# Patient Record
Sex: FEMALE | Race: WHITE
[De-identification: names, ages, dates, MRNs, and addresses within clinical notes are randomized per-mention and may not be internally consistent; named-entity substitution may affect disease eponyms.]

---

## 2018-09-23 ENCOUNTER — HOSPITAL ENCOUNTER (EMERGENCY)
Dept: HOSPITAL 35 - ER | Age: 63
Discharge: HOME | End: 2018-09-23
Payer: COMMERCIAL

## 2018-09-23 VITALS — HEIGHT: 62 IN | WEIGHT: 170 LBS | BODY MASS INDEX: 31.28 KG/M2

## 2018-09-23 VITALS — SYSTOLIC BLOOD PRESSURE: 136 MMHG | DIASTOLIC BLOOD PRESSURE: 79 MMHG

## 2018-09-23 DIAGNOSIS — I10: ICD-10-CM

## 2018-09-23 DIAGNOSIS — M54.32: Primary | ICD-10-CM

## 2018-09-23 DIAGNOSIS — F17.210: ICD-10-CM

## 2018-09-23 DIAGNOSIS — E03.9: ICD-10-CM

## 2018-09-23 DIAGNOSIS — F41.9: ICD-10-CM

## 2018-11-30 ENCOUNTER — HOSPITAL ENCOUNTER (INPATIENT)
Dept: HOSPITAL 96 - M.ERS | Age: 63
LOS: 4 days | Discharge: HOME | DRG: 394 | End: 2018-12-04
Attending: INTERNAL MEDICINE | Admitting: INTERNAL MEDICINE
Payer: MEDICAID

## 2018-11-30 VITALS
SYSTOLIC BLOOD PRESSURE: 126 MMHG | BODY MASS INDEX: 30.89 KG/M2 | DIASTOLIC BLOOD PRESSURE: 84 MMHG | WEIGHT: 170 LBS | HEIGHT: 62.01 IN

## 2018-11-30 VITALS — DIASTOLIC BLOOD PRESSURE: 75 MMHG | SYSTOLIC BLOOD PRESSURE: 139 MMHG

## 2018-11-30 VITALS — SYSTOLIC BLOOD PRESSURE: 105 MMHG | DIASTOLIC BLOOD PRESSURE: 69 MMHG

## 2018-11-30 DIAGNOSIS — F12.90: ICD-10-CM

## 2018-11-30 DIAGNOSIS — K64.4: ICD-10-CM

## 2018-11-30 DIAGNOSIS — E03.9: ICD-10-CM

## 2018-11-30 DIAGNOSIS — K57.30: ICD-10-CM

## 2018-11-30 DIAGNOSIS — I10: ICD-10-CM

## 2018-11-30 DIAGNOSIS — F41.9: ICD-10-CM

## 2018-11-30 DIAGNOSIS — Z90.49: ICD-10-CM

## 2018-11-30 DIAGNOSIS — E66.9: ICD-10-CM

## 2018-11-30 DIAGNOSIS — G62.9: ICD-10-CM

## 2018-11-30 DIAGNOSIS — F17.210: ICD-10-CM

## 2018-11-30 DIAGNOSIS — K63.5: ICD-10-CM

## 2018-11-30 DIAGNOSIS — K44.9: ICD-10-CM

## 2018-11-30 DIAGNOSIS — E44.1: ICD-10-CM

## 2018-11-30 DIAGNOSIS — K55.9: Primary | ICD-10-CM

## 2018-11-30 DIAGNOSIS — K21.9: ICD-10-CM

## 2018-11-30 DIAGNOSIS — Z86.19: ICD-10-CM

## 2018-11-30 DIAGNOSIS — Z28.21: ICD-10-CM

## 2018-11-30 DIAGNOSIS — G89.4: ICD-10-CM

## 2018-11-30 DIAGNOSIS — K27.9: ICD-10-CM

## 2018-11-30 LAB
ABSOLUTE BASOPHILS: 0 THOU/UL (ref 0–0.2)
ABSOLUTE EOSINOPHILS: 0.2 THOU/UL (ref 0–0.7)
ABSOLUTE MONOCYTES: 1.1 THOU/UL (ref 0–1.2)
ALBUMIN SERPL-MCNC: 3 G/DL (ref 3.4–5)
ALP SERPL-CCNC: 74 U/L (ref 46–116)
ALT SERPL-CCNC: 13 U/L (ref 30–65)
ANION GAP SERPL CALC-SCNC: 7 MMOL/L (ref 7–16)
AST SERPL-CCNC: 12 U/L (ref 15–37)
BASOPHILS NFR BLD AUTO: 0.2 %
BILIRUB SERPL-MCNC: 0.2 MG/DL
BUN SERPL-MCNC: 10 MG/DL (ref 7–18)
CALCIUM SERPL-MCNC: 8.9 MG/DL (ref 8.5–10.1)
CHLORIDE SERPL-SCNC: 103 MMOL/L (ref 98–107)
CO2 SERPL-SCNC: 28 MMOL/L (ref 21–32)
CREAT SERPL-MCNC: 0.7 MG/DL (ref 0.6–1.3)
EOSINOPHIL NFR BLD: 3.2 %
GLUCOSE SERPL-MCNC: 133 MG/DL (ref 70–99)
GRANULOCYTES NFR BLD MANUAL: 45.9 %
HCT VFR BLD CALC: 42.7 % (ref 37–47)
HGB BLD-MCNC: 14.2 GM/DL (ref 12–15)
LIPASE: 87 U/L (ref 73–393)
LYMPHOCYTES # BLD: 2 THOU/UL (ref 0.8–5.3)
LYMPHOCYTES NFR BLD AUTO: 33.2 %
MCH RBC QN AUTO: 29.3 PG (ref 26–34)
MCHC RBC AUTO-ENTMCNC: 33.2 G/DL (ref 28–37)
MCV RBC: 88.2 FL (ref 80–100)
MONOCYTES NFR BLD: 17.5 %
MPV: 8 FL. (ref 7.2–11.1)
NEUTROPHILS # BLD: 2.8 THOU/UL (ref 1.6–8.1)
NUCLEATED RBCS: 0 /100WBC
PLATELET COUNT*: 436 THOU/UL (ref 150–400)
POTASSIUM SERPL-SCNC: 4.2 MMOL/L (ref 3.5–5.1)
PROT SERPL-MCNC: 6.6 G/DL (ref 6.4–8.2)
RBC # BLD AUTO: 4.84 MIL/UL (ref 4.2–5)
RDW-CV: 14.5 % (ref 10.5–14.5)
SODIUM SERPL-SCNC: 138 MMOL/L (ref 136–145)
TROPONIN-I LEVEL: <0.06 NG/ML (ref ?–0.06)
WBC # BLD AUTO: 6.1 THOU/UL (ref 4–11)

## 2018-11-30 NOTE — CON
[facility] 
[address] [place]  [address]                    CONSULTATION                  
_______________________________________________________________________________
 
Name:       MARGOTH LINK            Room:           [address] [place]  
M.R.#:  [id number]      Account #:      [id number]  
Admission:  11/30/18     Attend Phys:    Swathi Jenkins MD 
Discharge:               Date of Birth:  [birth year]  
         Report #: [id number]
                                                                     [id number]  
_______________________________________________________________________________
THIS REPORT FOR:  //name//                      
 
CC: SWATHI Kaye DO
 
DATE OF SERVICE:  12/01/2018
 
 
REFERRING PHYSICIAN:  Swathi Jenkins M.D.
 
REASON FOR CONSULTATION:  Abdominal pain and bleeding.
 
IMPRESSION:
1.  Acute left lower quadrant pain associated with diarrhea.
2.  Abnormal CAT scan demonstrated left-sided colitis - suspect ischemic colitis
versus less likely Crohn's, most likely colitis or tumor.
3.  History of peptic ulcer disease and reflux, well controlled on omeprazole.
4.  History of hepatitis C infection, which has apparently resolved
spontaneously per the patient, with the patient having undergone repeated
studies that demonstrated no evidence for viral replication - risk factors for
hepatitis C include remote use of drugs in the past.
5.  History of sciatica, requiring intermittent Medrol Dosepak and requiring
narcotics, including hydrocodone 10/325 up to 3-4 times a day.
 
RECOMMENDATIONS:
1.  We will proceed with bowel preparation over the next couple of days.
2.  We will proceed with upper and lower endoscopy on Monday afternoon.
3.  We will check labs to ensure that hepatitis C is completely resolved.
4.  We will review old records from [facility] where she was recently
admitted to the hospital earlier this month with problems related to acute left
lower quadrant pain associated with diarrhea and abnormal CAT scan demonstrating
left-sided colitis.
 
I have discussed these plans with the patient as well and she is agreeable to
the same.
 
HISTORY OF PRESENT ILLNESS:  The patient is a 63-year old white female who
presented to the Emergency Room with complaints of worsening left lower quadrant
pain with associated loose stools and some mild rectal bleeding.  She had been
seen earlier in the month with rather similar-type complaints at [place] and
left AMA secondary to poor pain control.  When she has this episode of severe
pain, she gets a problems with being a little more constipated for a few days
and then had problems with severe abdominal pain associated with diarrhea.  She
has never had any _____ in the past.  She denies any fevers or chills and denies
 
 
 
[place] [address]                    CONSULTATION                  
_______________________________________________________________________________
 
Name:       MARGOTH LINK            Room:           [address] [place]  
[facility]#:  [id number]      Account #:      [id number]  
Admission:  11/30/18     Attend Phys:    Swathi Jenkins MD 
Discharge:               Date of Birth:  [birth year]  
         Report #: [id number]
                                                                     [id number]  
_______________________________________________________________________________
any major issues referable to her lower GI tract usually.  She has undergone
endoscopic evaluation of the upper GI tract in the past, but that has been a
number of years, if at all and she has never had a colonoscopy in the past.  She
is now admitted for further evaluation and treatment.
 
DRUG ALLERGIES:  None.
MEDICATIONS:  At home include hydrocodone, alprazolam, omeprazole, gabapentin
and levothyroxine.
 
PAST MEDICAL HISTORY:  Remarkable for hypertension, anxiety and chronic acid
reflux.  She has some problems with peripheral neuropathy and sciatica.  She has
chronic pain syndrome.  She had previous cholecystectomy.  She has history of
ulcers and history of hepatitis C, as I mentioned above.
 
SOCIAL HISTORY:  The patient smokes up to half a pack to a pack per day and
drinks only occasional alcohol.  Does smoke marijuana and history of remote
cocaine use.
 
FAMILY HISTORY:  Negative.
 
PHYSICAL EXAMINATION:
GENERAL:  Pleasant 63-year-old white female, who is in no major distress.
CARDIOPULMONARY EXAMINATION:  Revealed a regular rate and rhythm.
LUNGS:  Clear.
ABDOMEN:  Soft.  She is mildly tender in the left lower quadrant.  No rebound or
guarding noted.
 
LABORATORY DATA:  Her laboratory test revealed a white count of 6.1, hemoglobin
14.2, platelet count 436,000, MCV is 88.2 and RDW 14.5.  Her sodium is 138,
potassium 4.2, chloride 103, bicarbonate 28, BUN 10, creatinine is 0.7 and her
GFR is 85.  Total bilirubin is 0.2, alkaline phosphatase 74, AST 12 and ALT 13
and albumin is 3.0.
 
CT scan reviewed and revealed evidence for thickening of the left-sided colon,
suspicious for ischemia.  She does have post-surgical changes noted, compatible
with her gallbladder being removed.  Otherwise, her CT was unremarkable.
 
DISCUSSION:  At the present time, I suspect the patient had a bout of ischemic
colitis.  We will proceed with endoscopic evaluation of her upper and lower GI
tract in a couple of days and make further recommendations thereafter.
 
 
 
 
                       
                                        By:                                
                 
D: 12/03/18 0110_______________________________________
T: 12/03/18 1205Malcom Lott DO             /nt

## 2018-11-30 NOTE — PROC
[address]  [address]                    PROCEDURE REPORT              
_______________________________________________________________________________
 
Name:       MARGOTH LINK            Room:           [address] [place]  
M.R.#:  [id number]      Account #:      [id number]  
Admission:  11/30/18     Attend Phys:    Troy Jenkins MD 
Discharge:  12/04/18     Date of Birth:  [birth year]  
         Report #: [id number]
                                                                                
_______________________________________________________________________________
THIS REPORT FOR:  //name//                      
 
For GI report, Please see the Provation report in Perceptive 7 content.
 
 
 
 
 
 
 
 
 
 
 
 
 
 
 
 
 
 
 
 
 
 
 
 
 
 
 
 
 
 
 
 
 
 
 
 
 
 
 
 
 
                       
                                        By:                                
                 
D: 12/03/18     _______________________________________
T: 12/06/18 0646Medical Records Staff ANGELA       /AL

## 2018-12-01 VITALS — DIASTOLIC BLOOD PRESSURE: 78 MMHG | SYSTOLIC BLOOD PRESSURE: 129 MMHG

## 2018-12-01 VITALS — SYSTOLIC BLOOD PRESSURE: 144 MMHG | DIASTOLIC BLOOD PRESSURE: 74 MMHG

## 2018-12-01 VITALS — SYSTOLIC BLOOD PRESSURE: 150 MMHG | DIASTOLIC BLOOD PRESSURE: 73 MMHG

## 2018-12-01 VITALS — SYSTOLIC BLOOD PRESSURE: 115 MMHG | DIASTOLIC BLOOD PRESSURE: 73 MMHG

## 2018-12-01 LAB
BENZODIAZ UR-MCNC: POSITIVE UG/L
BILIRUB UR-MCNC: NEGATIVE MG/DL
COLOR UR: YELLOW
KETONES UR STRIP-MCNC: NEGATIVE MG/DL
PROT UR QL STRIP: NEGATIVE
RBC # UR STRIP: NEGATIVE /UL
SP GR UR STRIP: 1.02 (ref 1–1.03)
THC: POSITIVE
URINE CLARITY: CLEAR
URINE GLUCOSE-RANDOM: NEGATIVE
URINE LEUKOCYTES-REFLEX: NEGATIVE
URINE NITRITE-REFLEX: NEGATIVE
UROBILINOGEN UR STRIP-ACNC: 1 E.U./DL (ref 0.2–1)

## 2018-12-02 VITALS — DIASTOLIC BLOOD PRESSURE: 69 MMHG | SYSTOLIC BLOOD PRESSURE: 123 MMHG

## 2018-12-02 VITALS — SYSTOLIC BLOOD PRESSURE: 124 MMHG | DIASTOLIC BLOOD PRESSURE: 71 MMHG

## 2018-12-02 VITALS — SYSTOLIC BLOOD PRESSURE: 140 MMHG | DIASTOLIC BLOOD PRESSURE: 69 MMHG

## 2018-12-02 LAB
ABSOLUTE BASOPHILS: 0.1 THOU/UL (ref 0–0.2)
ABSOLUTE EOSINOPHILS: 0.1 THOU/UL (ref 0–0.7)
ABSOLUTE MONOCYTES: 1 THOU/UL (ref 0–1.2)
ALBUMIN SERPL-MCNC: 2.8 G/DL (ref 3.4–5)
ALP SERPL-CCNC: 149 U/L (ref 46–116)
ALT SERPL-CCNC: 42 U/L (ref 30–65)
ANION GAP SERPL CALC-SCNC: 10 MMOL/L (ref 7–16)
AST SERPL-CCNC: 63 U/L (ref 15–37)
BASOPHILS NFR BLD AUTO: 0.9 %
BILIRUB SERPL-MCNC: 0.3 MG/DL
BUN SERPL-MCNC: 8 MG/DL (ref 7–18)
CALCIUM SERPL-MCNC: 8.8 MG/DL (ref 8.5–10.1)
CHLORIDE SERPL-SCNC: 99 MMOL/L (ref 98–107)
CO2 SERPL-SCNC: 28 MMOL/L (ref 21–32)
CREAT SERPL-MCNC: 0.9 MG/DL (ref 0.6–1.3)
EOSINOPHIL NFR BLD: 2.1 %
ESR (SEDRATE): 29 MM/HR (ref 0–30)
GLUCOSE SERPL-MCNC: 136 MG/DL (ref 70–99)
GRANULOCYTES NFR BLD MANUAL: 48.4 %
HCT VFR BLD CALC: 41.5 % (ref 37–47)
HGB BLD-MCNC: 13.5 GM/DL (ref 12–15)
LYMPHOCYTES # BLD: 2.3 THOU/UL (ref 0.8–5.3)
LYMPHOCYTES NFR BLD AUTO: 34 %
MAGNESIUM SERPL-MCNC: 1.6 MG/DL (ref 1.8–2.4)
MCH RBC QN AUTO: 28.8 PG (ref 26–34)
MCHC RBC AUTO-ENTMCNC: 32.6 G/DL (ref 28–37)
MCV RBC: 88.5 FL (ref 80–100)
MONOCYTES NFR BLD: 14.6 %
MPV: 8.3 FL. (ref 7.2–11.1)
NEUTROPHILS # BLD: 3.3 THOU/UL (ref 1.6–8.1)
NUCLEATED RBCS: 0 /100WBC
PLATELET COUNT*: 491 THOU/UL (ref 150–400)
POTASSIUM SERPL-SCNC: 3.8 MMOL/L (ref 3.5–5.1)
PROT SERPL-MCNC: 6.5 G/DL (ref 6.4–8.2)
RBC # BLD AUTO: 4.7 MIL/UL (ref 4.2–5)
RDW-CV: 14 % (ref 10.5–14.5)
SODIUM SERPL-SCNC: 137 MMOL/L (ref 136–145)
WBC # BLD AUTO: 6.7 THOU/UL (ref 4–11)

## 2018-12-02 NOTE — [place] [address]
Phone:  [phone]                     ELECTROCARDIOGRAM REPORT      
_______________________________________________________________________________
 
Name:       MARGOTH LINK            Room:           [address]    ADM [place]  
[facility].#:  [id number]      Account #:      [id number]  
Admission:  18     Attend Phys:    Troy Jenkins MD 
Discharge:               Date of Birth:  [birth year]  
         Report #: [id number]
    [id number]
_______________________________________________________________________________
THIS REPORT FOR:  //name//                      
 
                         [place] [facility]
                                       
Test Date:    2018               Test Time:    16:44:30
Pat Name:     MARGOTH LINK           Department:   
Patient ID:   [id number]            Room:         [facility]
Gender:       F                        Technician:   NITESH DIAMOND
:          1955               Requested By: Kalee Robin
Order Number: [id number] MD:   Winston Henriquez
                                 Measurements
Intervals                              Axis          
Rate:         80                       P:            -11
WV:           112                      QRS:          56
QRSD:         106                      T:            15
QT:           371                                    
QTc:          428                                    
                           Interpretive Statements
Sinus rhythm
Borderline short WV interval
Low voltage, precordial leads
RSR' in V1 or V2, right VCD or RVH
Baseline wander in lead(s) V2
No previous ECG available for comparison
 
Electronically Signed On 2018 15:33:23 CST by Winston Henriquez
[email or web address]
 
 
 
 
 
 
 
 
 
 
 
 
 
 
 
 
  <ELECTRONICALLY SIGNED>
                                           By: Winston Henriquez MD, FACC   
  18     1533
D: 18 1644   _____________________________________
T: 18 1644   Winston Henriquez MD, FACC     /EPI

## 2018-12-03 VITALS — DIASTOLIC BLOOD PRESSURE: 69 MMHG | SYSTOLIC BLOOD PRESSURE: 123 MMHG

## 2018-12-03 VITALS — SYSTOLIC BLOOD PRESSURE: 123 MMHG | DIASTOLIC BLOOD PRESSURE: 69 MMHG

## 2018-12-03 VITALS — DIASTOLIC BLOOD PRESSURE: 77 MMHG | SYSTOLIC BLOOD PRESSURE: 108 MMHG

## 2018-12-03 VITALS — SYSTOLIC BLOOD PRESSURE: 108 MMHG | DIASTOLIC BLOOD PRESSURE: 77 MMHG

## 2018-12-03 LAB
ABSOLUTE BASOPHILS: 0.1 THOU/UL (ref 0–0.2)
ABSOLUTE EOSINOPHILS: 0.1 THOU/UL (ref 0–0.7)
ABSOLUTE MONOCYTES: 0.9 THOU/UL (ref 0–1.2)
ANION GAP SERPL CALC-SCNC: 9 MMOL/L (ref 7–16)
BASOPHILS NFR BLD AUTO: 1.4 %
BUN SERPL-MCNC: 8 MG/DL (ref 7–18)
CALCIUM SERPL-MCNC: 9.2 MG/DL (ref 8.5–10.1)
CHLORIDE SERPL-SCNC: 102 MMOL/L (ref 98–107)
CO2 SERPL-SCNC: 26 MMOL/L (ref 21–32)
CREAT SERPL-MCNC: 0.7 MG/DL (ref 0.6–1.3)
EOSINOPHIL NFR BLD: 2.2 %
GLUCOSE SERPL-MCNC: 144 MG/DL (ref 70–99)
GRANULOCYTES NFR BLD MANUAL: 43.3 %
HCT VFR BLD CALC: 41.8 % (ref 37–47)
HGB BLD-MCNC: 13.6 GM/DL (ref 12–15)
LYMPHOCYTES # BLD: 2.4 THOU/UL (ref 0.8–5.3)
LYMPHOCYTES NFR BLD AUTO: 39 %
MCH RBC QN AUTO: 28.6 PG (ref 26–34)
MCHC RBC AUTO-ENTMCNC: 32.4 G/DL (ref 28–37)
MCV RBC: 88.1 FL (ref 80–100)
MONOCYTES NFR BLD: 14.1 %
MPV: 8.1 FL. (ref 7.2–11.1)
NEUTROPHILS # BLD: 2.7 THOU/UL (ref 1.6–8.1)
NUCLEATED RBCS: 0 /100WBC
PLATELET COUNT*: 455 THOU/UL (ref 150–400)
POTASSIUM SERPL-SCNC: 3.8 MMOL/L (ref 3.5–5.1)
RBC # BLD AUTO: 4.74 MIL/UL (ref 4.2–5)
RDW-CV: 14 % (ref 10.5–14.5)
SODIUM SERPL-SCNC: 137 MMOL/L (ref 136–145)
WBC # BLD AUTO: 6.1 THOU/UL (ref 4–11)

## 2018-12-03 PROCEDURE — 0DB68ZX EXCISION OF STOMACH, VIA NATURAL OR ARTIFICIAL OPENING ENDOSCOPIC, DIAGNOSTIC: ICD-10-PCS | Performed by: INTERNAL MEDICINE

## 2018-12-03 PROCEDURE — 0DBN8ZX EXCISION OF SIGMOID COLON, VIA NATURAL OR ARTIFICIAL OPENING ENDOSCOPIC, DIAGNOSTIC: ICD-10-PCS | Performed by: INTERNAL MEDICINE

## 2018-12-03 PROCEDURE — 0DBP8ZZ EXCISION OF RECTUM, VIA NATURAL OR ARTIFICIAL OPENING ENDOSCOPIC: ICD-10-PCS | Performed by: INTERNAL MEDICINE

## 2018-12-04 VITALS — SYSTOLIC BLOOD PRESSURE: 108 MMHG | DIASTOLIC BLOOD PRESSURE: 77 MMHG

## 2018-12-04 VITALS — SYSTOLIC BLOOD PRESSURE: 122 MMHG | DIASTOLIC BLOOD PRESSURE: 68 MMHG

## 2018-12-04 VITALS — DIASTOLIC BLOOD PRESSURE: 82 MMHG | SYSTOLIC BLOOD PRESSURE: 139 MMHG

## 2018-12-04 LAB
ANION GAP SERPL CALC-SCNC: 5 MMOL/L (ref 7–16)
BUN SERPL-MCNC: 7 MG/DL (ref 7–18)
CALCIUM SERPL-MCNC: 9.3 MG/DL (ref 8.5–10.1)
CHLORIDE SERPL-SCNC: 105 MMOL/L (ref 98–107)
CO2 SERPL-SCNC: 27 MMOL/L (ref 21–32)
CREAT SERPL-MCNC: 0.7 MG/DL (ref 0.6–1.3)
GLUCOSE SERPL-MCNC: 160 MG/DL (ref 70–99)
MAGNESIUM SERPL-MCNC: 1.9 MG/DL (ref 1.8–2.4)
POTASSIUM SERPL-SCNC: 3.9 MMOL/L (ref 3.5–5.1)
SODIUM SERPL-SCNC: 137 MMOL/L (ref 136–145)

## 2018-12-06 NOTE — PATH
[address]  [address]                    PATHOLOGY RPT PROCEDURE       
_______________________________________________________________________________
 
Name:       MIRI QUINTERO            Room:           [address] [place]  
.R.#:  [id number]      Account #:      [id number]  
Admission:  11/30/18     Date of Birth:  [birth year]  
Discharge:  12/04/18                   Report #:    [id number]
                                                         Path Case #: [id number]
_______________________________________________________________________________
 
[facility] Accession Number: [id number]
.                                                                01
Material submitted:                                        .
PART A: ANTRAL BIOPSY FOR H-PYLORI
PART B: BIOPSY SEVERE ULCERATION SIGMOID COLON
PART C: DISTAL SIGMOID COLON POLYP
.                                                                01
Clinical history:                                          .
None provided
.                                                                02
**********************************************************************
Diagnosis:
A. Antral biopsy:
- Moderate non-specific active gastritis, negative for Helicobacter pylori
organisms, granulomas and dysplasia.
.
B. Biopsy severe ulceration sigmoid colon:
- Non-specific active colitis with ulceration, negative for granulomas,
viral inclusions and dysplasia.  See comment.
.
C. Distal sigmoid colon polyp:
- Tubular adenoma, negative for high grade dysplasia.
[facility]/12/04/2018
**********************************************************************
.                                                                02
Comment:
The sigmoid colon ulceration biopsy (B) shows several fragments of near
normal to minimally inflamed colonic mucosa as well as fragments showing
evidence of severe active inflammation and ulceration where there is some
crypt distortion but no significant basal lymphoplasmacytosis to elevate a
suspicion for inflammatory bowel disease although this possibility cannot
be excluded.
(FATIMAH/db; 12/04/2018)
.
Special stain on A: H. pylori
.                                                                02
Electronically signed:                                     .
Mark Sauceda MD, Pathologist
NPI- [id number]
.                                                                01
Gross description:                                         .
A.  Received in formalin labeled "Miri Quintero, antral biopsy for H.
pylori," is a single segment of tan soft tissue measuring 0.5 cm in
maximum dimension.  The specimen is entirely submitted in cassette A1.
.
B.  Received in formalin labeled "Miri Quintero, biopsy severe
ulceration sigmoid colon," are multiple segments of tan soft tissue
measuring 1.1 x 0.3 x 0.1 cm in aggregate dimensions.  The specimen [place] [address]                    PATHOLOGY RPT PROCEDURE       
_______________________________________________________________________________
 
Name:       MIRI QUINTERO            Room:           [address] [place]  
M.R.#:  [id number]      Account #:      [id number]  
Admission:  11/30/18     Date of Birth:  [birth year]  
Discharge:  12/04/18                   Report #:    [id number]
                                                         Path Case #: [id number]
_______________________________________________________________________________
filtered and entirely submitted in cassette B1.
.
C.  Received in formalin labeled "Miri Quintero, distal sigmoid colon
polyp," is a 0.8 x 0.5 x 0.4 cm polypoid piece of tan soft tissue.  The
margin is inked and the specimen is sectioned perpendicular to the margin
and entirely submitted in cassette C1.
(TSD; 12/3/2018)
TOB/TOB
.                                                                02
Pathologist provided ICD-10:
K29.70, K52.9, D12.5
.                                                                02
CPT                                                        .
115775, 157415, 893212, W16236
Specimen Comment: A courtesy copy of this report has been sent to
Specimen Comment: [phone], [phone], [phone].
Specimen Comment: Report sent to ,DR CARDENAS / DR SOLIS
Specimen Comment: A duplicate report has been generated due to demographic
updates.
***Performed at:  01
   [facility]
   [address], [place], [place]  [id number]
   MD Jaime Waddell MD Phone:  [phone]
***Performed at:  02
   [facility] [address], [place], [place]  [id number]
   MD Mark Sauceda MD Phone:  [phone]

## 2018-12-29 ENCOUNTER — HOSPITAL ENCOUNTER (INPATIENT)
Dept: HOSPITAL 96 - M.ERS | Age: 63
LOS: 2 days | Discharge: HOME | DRG: 392 | End: 2018-12-31
Attending: INTERNAL MEDICINE | Admitting: INTERNAL MEDICINE
Payer: MEDICAID

## 2018-12-29 VITALS — HEIGHT: 62.01 IN | BODY MASS INDEX: 32.34 KG/M2 | WEIGHT: 178 LBS

## 2018-12-29 VITALS — SYSTOLIC BLOOD PRESSURE: 111 MMHG | DIASTOLIC BLOOD PRESSURE: 73 MMHG

## 2018-12-29 VITALS — SYSTOLIC BLOOD PRESSURE: 143 MMHG | DIASTOLIC BLOOD PRESSURE: 86 MMHG

## 2018-12-29 DIAGNOSIS — Z79.899: ICD-10-CM

## 2018-12-29 DIAGNOSIS — F41.9: ICD-10-CM

## 2018-12-29 DIAGNOSIS — Z86.19: ICD-10-CM

## 2018-12-29 DIAGNOSIS — Z87.11: ICD-10-CM

## 2018-12-29 DIAGNOSIS — Z90.49: ICD-10-CM

## 2018-12-29 DIAGNOSIS — K52.89: Primary | ICD-10-CM

## 2018-12-29 DIAGNOSIS — F17.210: ICD-10-CM

## 2018-12-29 DIAGNOSIS — K21.9: ICD-10-CM

## 2018-12-29 DIAGNOSIS — E86.0: ICD-10-CM

## 2018-12-29 DIAGNOSIS — E03.9: ICD-10-CM

## 2018-12-29 DIAGNOSIS — I10: ICD-10-CM

## 2018-12-29 LAB
ABSOLUTE BASOPHILS: 0.1 THOU/UL (ref 0–0.2)
ABSOLUTE EOSINOPHILS: 0.6 THOU/UL (ref 0–0.7)
ABSOLUTE MONOCYTES: 1 THOU/UL (ref 0–1.2)
ALBUMIN SERPL-MCNC: 3.4 G/DL (ref 3.4–5)
ALP SERPL-CCNC: 79 U/L (ref 46–116)
ALT SERPL-CCNC: 15 U/L (ref 30–65)
AMP/METHAMP: POSITIVE
ANION GAP SERPL CALC-SCNC: 12 MMOL/L (ref 7–16)
ANION GAP SERPL CALC-SCNC: 9 MMOL/L (ref 7–16)
AST SERPL-CCNC: 17 U/L (ref 15–37)
BASOPHILS NFR BLD AUTO: 1.2 %
BENZODIAZ UR-MCNC: POSITIVE UG/L
BILIRUB SERPL-MCNC: 0.3 MG/DL
BILIRUB UR-MCNC: NEGATIVE MG/DL
BUN SERPL-MCNC: 12 MG/DL (ref 7–18)
BUN SERPL-MCNC: 15 MG/DL (ref 7–18)
CALCIUM SERPL-MCNC: 10.2 MG/DL (ref 8.5–10.1)
CALCIUM SERPL-MCNC: 9 MG/DL (ref 8.5–10.1)
CHLORIDE SERPL-SCNC: 104 MMOL/L (ref 98–107)
CHLORIDE SERPL-SCNC: 105 MMOL/L (ref 98–107)
CO2 SERPL-SCNC: 21 MMOL/L (ref 21–32)
CO2 SERPL-SCNC: 25 MMOL/L (ref 21–32)
COLOR UR: YELLOW
CREAT SERPL-MCNC: 0.5 MG/DL (ref 0.6–1.3)
CREAT SERPL-MCNC: 0.7 MG/DL (ref 0.6–1.3)
EOSINOPHIL NFR BLD: 6 %
GLUCOSE SERPL-MCNC: 102 MG/DL (ref 70–99)
GLUCOSE SERPL-MCNC: 161 MG/DL (ref 70–99)
GRANULOCYTES NFR BLD MANUAL: 37.2 %
HCT VFR BLD CALC: 43.9 % (ref 37–47)
HGB BLD-MCNC: 14.4 GM/DL (ref 12–15)
KETONES UR STRIP-MCNC: NEGATIVE MG/DL
LYMPHOCYTES # BLD: 4.2 THOU/UL (ref 0.8–5.3)
LYMPHOCYTES NFR BLD AUTO: 44.7 %
MAGNESIUM SERPL-MCNC: 1.6 MG/DL (ref 1.8–2.4)
MCH RBC QN AUTO: 29 PG (ref 26–34)
MCHC RBC AUTO-ENTMCNC: 32.8 G/DL (ref 28–37)
MCV RBC: 88.7 FL (ref 80–100)
MONOCYTES NFR BLD: 10.9 %
MPV: 8.1 FL. (ref 7.2–11.1)
NEUTROPHILS # BLD: 3.5 THOU/UL (ref 1.6–8.1)
NUCLEATED RBCS: 0 /100WBC
PLATELET COUNT*: 223 THOU/UL (ref 150–400)
POTASSIUM SERPL-SCNC: 3.9 MMOL/L (ref 3.5–5.1)
POTASSIUM SERPL-SCNC: 4.7 MMOL/L (ref 3.5–5.1)
PROT SERPL-MCNC: 6.5 G/DL (ref 6.4–8.2)
PROT UR QL STRIP: NEGATIVE
RBC # BLD AUTO: 4.95 MIL/UL (ref 4.2–5)
RBC # UR STRIP: NEGATIVE /UL
RDW-CV: 14.2 % (ref 10.5–14.5)
SODIUM SERPL-SCNC: 137 MMOL/L (ref 136–145)
SODIUM SERPL-SCNC: 139 MMOL/L (ref 136–145)
SP GR UR STRIP: 1.02 (ref 1–1.03)
THC: POSITIVE
URINE CLARITY: CLEAR
URINE GLUCOSE-RANDOM: NEGATIVE
URINE LEUKOCYTES-REFLEX: NEGATIVE
URINE NITRITE-REFLEX: NEGATIVE
UROBILINOGEN UR STRIP-ACNC: 0.2 E.U./DL (ref 0.2–1)
WBC # BLD AUTO: 9.5 THOU/UL (ref 4–11)

## 2018-12-30 VITALS — DIASTOLIC BLOOD PRESSURE: 80 MMHG | SYSTOLIC BLOOD PRESSURE: 144 MMHG

## 2018-12-31 VITALS — DIASTOLIC BLOOD PRESSURE: 78 MMHG | SYSTOLIC BLOOD PRESSURE: 140 MMHG

## 2018-12-31 VITALS — SYSTOLIC BLOOD PRESSURE: 140 MMHG | DIASTOLIC BLOOD PRESSURE: 78 MMHG

## 2018-12-31 LAB
ANION GAP SERPL CALC-SCNC: 6 MMOL/L (ref 7–16)
BUN SERPL-MCNC: 10 MG/DL (ref 7–18)
CALCIUM SERPL-MCNC: 9.4 MG/DL (ref 8.5–10.1)
CHLORIDE SERPL-SCNC: 108 MMOL/L (ref 98–107)
CO2 SERPL-SCNC: 27 MMOL/L (ref 21–32)
CREAT SERPL-MCNC: 0.8 MG/DL (ref 0.6–1.3)
GLUCOSE SERPL-MCNC: 117 MG/DL (ref 70–99)
POTASSIUM SERPL-SCNC: 4 MMOL/L (ref 3.5–5.1)
SODIUM SERPL-SCNC: 141 MMOL/L (ref 136–145)

## 2019-01-03 NOTE — CON
[facility] 
[address] [place]  [address]                    CONSULTATION                  
_______________________________________________________________________________
 
Name:       MARGOTH LINK            Room:           [address] [place]  
M.R.#:  [id number]      Account #:      [id number]  
Admission:  12/29/18     Attend Phys:    Darryn Gonzales MD 
Discharge:  12/31/18     Date of Birth:  [birth year]  
         Report #: [id number]
                                                                     [id number]  
_______________________________________________________________________________
THIS REPORT FOR:  //name//                      
 
CC: Marianela Gonzales MD
 
DATE OF SERVICE:  12/29/2018
 
 
REQUESTING PHYSICIAN:  Darryn Gonzales M.D.
 
REASON FOR CONSULTATION:  Colitis.
 
HISTORY OF PRESENT ILLNESS:  This is a 63-year-old female who was hospitalized a
month ago for abdominal pain, diarrhea and colitis per CT and colonoscopy.  The
biopsies were consistent with colonic ischemia.  She had severe inflammation at
that time to a point that Dr. Lott did not feel safe to complete her
colonoscopy fearing perforation.  The patient then went home and was on pain
medication.  Naturally, this had caused her to have some constipation.  Even
though she was taking MiraLax 17 grams daily, but she was producing just a small
amount of stool per day.  She reports that she never felt better and continued
to have abdominal pain.  The patient then returned to hospital as her pain was
in resolving.
 
Upon hospitalization, she had a repeat CAT scan, which showed persistent
inflammation in the right and left colon.  This was consistent with previous
imaging studies.
 
Currently, she is on Flagyl and Levaquin and on low-residue diet.  
 
She denies any hematochezia or melena.  Her abdomen is less distended compared
to yesterday.
 
PAST MEDICAL HISTORY:  Significant for history of colonic ischemia,
hypertension, hypothyroidism, anxiety, gallbladder disease status post
cholecystectomy, peptic ulcer disease, GERD, history of hep C which has resolved
on its own and sciatic nerve pain.
 
ALLERGIES:  No known drug allergy.
 
MEDICATIONS:  Please refer to MAR.
 
SOCIAL HISTORY:  The patient admits to tobaccoism and occasionally have
alcoholic beverage.
 
FAMILY HISTORY:  Negative for GI malignancy.
 
 
 
[place] [address]                    CONSULTATION                  
_______________________________________________________________________________
 
Name:       MARGOTH LINK            Room:           [address]#:  [id number]      Account #:      [id number]  
Admission:  12/29/18     Attend Phys:    Darryn Gonzales MD 
Discharge:  12/31/18     Date of Birth:  [birth year]  
         Report #: [id number]
                                                                     [id number]  
_______________________________________________________________________________
 
PHYSICAL EXAMINATION:
VITAL SIGNS:  Reveals blood pressure of 111/73, respirations 16, pulse 83 and
temperature 97.9.
LUNGS:  Clear.
CARDIOVASCULAR:  Regular.
ABDOMEN:  Soft, tender to palpation in the left upper and lower quadrants. 
Bowel sounds are positive.
NEUROLOGICAL:  The patient is alert and oriented x 3.
 
LABORATORY DATA:  Labs reveal sodium of 139, potassium 3.9, BUN is 12,
creatinine 0.7 and glucose 161.  AST is 17, ALT 15 and alkaline phosphatase 79. 
WBC is 9.5 and hemoglobin is 14.4 with platelet of _____23.
 
RADIOLOGICAL DATA:  As discussed above.
 
ASSESSMENT AND PLAN:  The patient with a history of colonic ischemia, which
appears to be persisting.  We will place the patient on MiraLax b.i.d.  Continue
IV antibiotics for the next couple of days and then send her home with p.o.
antibiotics for 10 days.  We still repeat her colonoscopy in 6-8 weeks.
 
 
 
 
 
 
 
 
 
 
 
 
 
 
 
 
 
 
 
 
 
 
 
 
<ELECTRONICALLY SIGNED>
                                        By:  Terrell Nelson MD            
01/03/19     1451
D: 12/29/18 1551_______________________________________
T: 12/30/18 0809Terrell Nelson MD               /nt

## 2020-02-20 ENCOUNTER — HOSPITAL ENCOUNTER (OUTPATIENT)
Dept: HOSPITAL 35 - RAD | Age: 65
End: 2020-02-20
Attending: FAMILY MEDICINE
Payer: COMMERCIAL

## 2020-02-20 DIAGNOSIS — M41.86: ICD-10-CM

## 2020-02-20 DIAGNOSIS — M46.04: ICD-10-CM

## 2020-02-20 DIAGNOSIS — M48.04: ICD-10-CM

## 2020-02-20 DIAGNOSIS — M43.17: Primary | ICD-10-CM

## 2020-03-06 ENCOUNTER — HOSPITAL ENCOUNTER (OUTPATIENT)
Dept: HOSPITAL 35 - RAD | Age: 65
End: 2020-03-06
Attending: FAMILY MEDICINE
Payer: COMMERCIAL

## 2020-03-06 DIAGNOSIS — M47.815: Primary | ICD-10-CM

## 2020-03-06 DIAGNOSIS — M43.17: ICD-10-CM

## 2020-03-06 DIAGNOSIS — M43.16: ICD-10-CM

## 2020-03-14 ENCOUNTER — HOSPITAL ENCOUNTER (EMERGENCY)
Dept: HOSPITAL 35 - ER | Age: 65
Discharge: HOME | End: 2020-03-14
Payer: COMMERCIAL

## 2020-03-14 VITALS — DIASTOLIC BLOOD PRESSURE: 84 MMHG | SYSTOLIC BLOOD PRESSURE: 120 MMHG

## 2020-03-14 VITALS — HEIGHT: 62 IN | WEIGHT: 164.99 LBS | BODY MASS INDEX: 30.36 KG/M2

## 2020-03-14 DIAGNOSIS — K21.9: ICD-10-CM

## 2020-03-14 DIAGNOSIS — Z79.899: ICD-10-CM

## 2020-03-14 DIAGNOSIS — M51.36: ICD-10-CM

## 2020-03-14 DIAGNOSIS — F17.210: ICD-10-CM

## 2020-03-14 DIAGNOSIS — M51.24: Primary | ICD-10-CM

## 2020-03-14 DIAGNOSIS — I10: ICD-10-CM

## 2020-03-14 DIAGNOSIS — E03.9: ICD-10-CM

## 2020-03-14 DIAGNOSIS — M48.061: ICD-10-CM

## 2020-09-25 ENCOUNTER — HOSPITAL ENCOUNTER (EMERGENCY)
Dept: HOSPITAL 35 - ER | Age: 65
End: 2020-09-25
Payer: COMMERCIAL

## 2020-09-25 VITALS — WEIGHT: 164.99 LBS | BODY MASS INDEX: 30.36 KG/M2 | HEIGHT: 62 IN

## 2020-09-25 VITALS — SYSTOLIC BLOOD PRESSURE: 135 MMHG | DIASTOLIC BLOOD PRESSURE: 80 MMHG

## 2020-09-25 DIAGNOSIS — I10: ICD-10-CM

## 2020-09-25 DIAGNOSIS — Z90.49: ICD-10-CM

## 2020-09-25 DIAGNOSIS — Z79.899: ICD-10-CM

## 2020-09-25 DIAGNOSIS — K21.9: ICD-10-CM

## 2020-09-25 DIAGNOSIS — F17.210: ICD-10-CM

## 2020-09-25 DIAGNOSIS — E03.9: ICD-10-CM

## 2020-09-25 DIAGNOSIS — M79.671: Primary | ICD-10-CM

## 2021-11-24 ENCOUNTER — HOSPITAL ENCOUNTER (EMERGENCY)
Dept: HOSPITAL 35 - ER | Age: 66
Discharge: HOME | End: 2021-11-24
Payer: COMMERCIAL

## 2021-11-24 VITALS
DIASTOLIC BLOOD PRESSURE: 77 MMHG | WEIGHT: 164.99 LBS | BODY MASS INDEX: 30.36 KG/M2 | HEIGHT: 62 IN | SYSTOLIC BLOOD PRESSURE: 134 MMHG

## 2021-11-24 DIAGNOSIS — K21.9: ICD-10-CM

## 2021-11-24 DIAGNOSIS — I10: ICD-10-CM

## 2021-11-24 DIAGNOSIS — F41.9: Primary | ICD-10-CM

## 2021-11-24 DIAGNOSIS — Z76.0: ICD-10-CM

## 2021-11-24 DIAGNOSIS — E03.9: ICD-10-CM

## 2021-11-24 DIAGNOSIS — F17.210: ICD-10-CM

## 2021-11-24 DIAGNOSIS — Z90.49: ICD-10-CM

## 2021-11-24 DIAGNOSIS — Z79.899: ICD-10-CM

## 2021-11-24 LAB — OPIATES UR-MCNC: POSITIVE NG/ML
